# Patient Record
Sex: FEMALE | Race: WHITE | Employment: UNEMPLOYED | URBAN - METROPOLITAN AREA
[De-identification: names, ages, dates, MRNs, and addresses within clinical notes are randomized per-mention and may not be internally consistent; named-entity substitution may affect disease eponyms.]

---

## 2024-05-10 ENCOUNTER — OFFICE VISIT (OUTPATIENT)
Dept: FAMILY MEDICINE CLINIC | Facility: CLINIC | Age: 24
End: 2024-05-10
Payer: COMMERCIAL

## 2024-05-10 VITALS
TEMPERATURE: 97.4 F | BODY MASS INDEX: 20.22 KG/M2 | SYSTOLIC BLOOD PRESSURE: 128 MMHG | DIASTOLIC BLOOD PRESSURE: 70 MMHG | WEIGHT: 118.4 LBS | HEART RATE: 74 BPM | HEIGHT: 64 IN

## 2024-05-10 DIAGNOSIS — Z11.3 SCREENING EXAMINATION FOR STI: ICD-10-CM

## 2024-05-10 DIAGNOSIS — R25.1 TREMOR: ICD-10-CM

## 2024-05-10 DIAGNOSIS — N92.6 IRREGULAR PERIODS: ICD-10-CM

## 2024-05-10 DIAGNOSIS — F32.A ANXIETY AND DEPRESSION: ICD-10-CM

## 2024-05-10 DIAGNOSIS — L98.9 SKIN LESION: ICD-10-CM

## 2024-05-10 DIAGNOSIS — Z23 ENCOUNTER FOR IMMUNIZATION: ICD-10-CM

## 2024-05-10 DIAGNOSIS — Z12.4 SCREENING FOR CERVICAL CANCER: ICD-10-CM

## 2024-05-10 DIAGNOSIS — Z76.89 ENCOUNTER TO ESTABLISH CARE: Primary | ICD-10-CM

## 2024-05-10 DIAGNOSIS — F41.9 ANXIETY AND DEPRESSION: ICD-10-CM

## 2024-05-10 DIAGNOSIS — Z13.6 SCREENING FOR CARDIOVASCULAR CONDITION: ICD-10-CM

## 2024-05-10 DIAGNOSIS — E28.2 PCOS (POLYCYSTIC OVARIAN SYNDROME): ICD-10-CM

## 2024-05-10 PROCEDURE — 99203 OFFICE O/P NEW LOW 30 MIN: CPT | Performed by: NURSE PRACTITIONER

## 2024-05-10 NOTE — PATIENT INSTRUCTIONS
Identify 5 Things You Can See  The first step is to look around you and identify five things you can see right now. Feel free to say them out loud or whisper them in your mind as you describe each item in detail to yourself, says Isaias.    They could be anything, like a plant in the corner, a painting on the wall, a crack in the tile, a bird outside the window, or a  plugged into a socket.    Identify 4 Things You Can Feel  The next step is to pay attention to how you feel. For this step, you can close your eyes, if that feels comfortable, or gently fix your gaze downward, says Isaias.    Now, list four things that you can feel. It could be a slight breeze, the warmth of the sun, the fabric of your clothes against your skin, the texture of your couch against your arm, or your breath as it goes in and out.    Identify 3 Things You Can Hear  Then, pay attention to the sounds around you and identify three things you can hear. It could be a horn blaring in the background, the low hum of the heater, or the television playing in another room.    For this step, you can continue to keep your eyes closed or your gaze downward, Isaias says. Notice the variety of sounds and their qualities.    Identify 2 Things You Can Smell  Next, engage your sense of smell. With your eyes still focused or closed, take a moment to notice two things you can smell right now, says Isaias. It could be the fumes of traffic, the air freshener in the room, or the aroma of food wafting toward you from a nearby eatery.    If you're having trouble identifying any smells, you can take a sniff of your coffee, a nearby flower, or even just the air itself.    Identify 1 Thing You Can Taste  Finally, bring your attention to your taste buds and identify one thing you can taste. It can be the balm on your lips, the gum you’re chewing, or the lingering flavor of the lemonade you had earlier.

## 2024-05-10 NOTE — PROGRESS NOTES
Name: Nora Alexander      : 2000      MRN: [id number]  Encounter Provider: GABE Lazaro  Encounter Date: 5/10/2024   Encounter department: [facility]    Assessment & Plan     1. Encounter to establish care    2. Anxiety and depression  -     TSH, 3rd generation with Free T4 reflex; Future  -     Insulin, fasting; Future    3. PCOS (polycystic ovarian syndrome)  -     Insulin, fasting; Future  -     Hemoglobin A1C; Future    4. Irregular periods  -     Ambulatory referral to Obstetrics / Gynecology; Future  -     Insulin, fasting; Future    5. Screening for cervical cancer  -     Ambulatory referral to Obstetrics / Gynecology; Future  -     Insulin, fasting; Future    6. Encounter for immunization  -     TDAP VACCINE GREATER THAN OR EQUAL TO 6YO IM  -     Insulin, fasting; Future    7. Skin lesion  -     Ambulatory Referral to Dermatology; Future  -     Insulin, fasting; Future    8. Screening for cardiovascular condition  -     CBC and differential; Future  -     Comprehensive metabolic panel; Future  -     Lipid panel; Future  -     Insulin, fasting; Future    9. Tremor  -     Magnesium; Future    10. Screening examination for STI  -     HSV 1/2 IgM and Type Specific IgG; Future  -     HIV 1/2 AG/AB w Reflex [facility] for 2 yr old and above; Future  -     RPR-Syphilis Screening (Total Syphilis IGG/IGM); Future  -     Chlamydia/GC amplified DNA by PCR; Future  -     Hepatitis C antibody; Future           Subjective      Patient presents to establish care. Patient did live in the [facility] for 4 years and had to move back due to the war. 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 Has a hx of PCOS. Did smoke for 3 year and quit now vapes, counseled. Occasional alcohol and marijuana use.     PCOS- was dx a few years ago. Was on OCP one time and did not like how she felt with it, does not wish to start any birth controlled again. Menses at 13 or 14 years old. States her  cycle is irregular. Usually about 1.5 months between cycles. Also has increase hair growth around areolas, thighs and abdomen which is what started work up for PCOS. She does notice that she has mood changes 1-2 weeks around her cycle. Does often times have anxiety and depression but it is worse around the time of her period. Has anxiety about the states of the world. Her boyfriend on the front lines in the [facility] and she still has family there, does want to return one day. 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 She will try to focus on some thing but her anxious thoughts will happen. Sometimes it effects her sleep. She does feel down but no SI/HI. States there are times she feels like she needs medications and then other times she does not. She did do therapy in the past but was no consistent with it. Does not feel like she needs further intervention for this but will call if symptoms worsen.     Skin lesion- right side of the neck, has had for years but it is getting bigger and darker/black in a spot. Also wants full skin check. F/u with derm. Skin protection reviewed.     Tremor- sometimes randomly her hands tremor- not always when she is anxious. Ongoing for a while. Fhx of the same. Will keep diary of when this happens and have labs checked. No other associated symptoms. Does go away after a few minutes.     States she did have a herpes outbreak in the past but does not remember if they did confirmatory testing, requesting STI screening- no symptoms           PHQ-2/9 Depression Screening    Little interest or pleasure in doing things: 1 - several days  Feeling down, depressed, or hopeless: 1 - several days  Trouble falling or staying asleep, or sleeping too much: 2 - more than half the days  Feeling tired or having little energy: 3 - nearly every day  Poor appetite or overeatin - more than half the days  Feeling bad  about yourself - or that you are a failure or have let yourself or your family down: 0 - not at all  Trouble concentrating on things, such as reading the newspaper or watching television: 1 - several days  Moving or speaking so slowly that other people could have noticed. Or the opposite - being so fidgety or restless that you have been moving around a lot more than usual: 1 - several days  Thoughts that you would be better off dead, or of hurting yourself in some way: 0 - not at all  PHQ-2 Score: 2  PHQ-2 Interpretation: Negative depression screen  PHQ-9 Score: 11  PHQ-9 Interpretation: Moderate depression       USHA-7 Flowsheet Screening    Flowsheet Row Most Recent Value   Over the last 2 weeks, how often have you been bothered by any of the following problems?    Feeling nervous, anxious, or on edge 1   Not being able to stop or control worrying 2   Worrying too much about different things 2   Trouble relaxing 1   Being so restless that it is hard to sit still 1   Becoming easily annoyed or irritable 3   Feeling afraid as if something awful might happen 0   USHA-7 Total Score 10            Review of Systems   Constitutional:  Negative for chills and fever.   HENT:  Negative for ear pain and sore throat.    Eyes:  Negative for pain and visual disturbance.   Respiratory:  Negative for cough and shortness of breath.    Cardiovascular:  Negative for chest pain and palpitations.   Gastrointestinal:  Negative for abdominal pain, constipation, diarrhea, nausea and vomiting.   Genitourinary:  Positive for menstrual problem. Negative for difficulty urinating, dysuria, hematuria and urgency.   Musculoskeletal:  Negative for arthralgias and back pain.   Skin:  Negative for color change and rash.   Neurological:  Negative for dizziness, seizures, syncope and headaches.        Tremor    Psychiatric/Behavioral:  Positive for dysphoric mood and sleep disturbance. Negative for self-injury and suicidal ideas. The patient is  "nervous/anxious.    All other systems reviewed and are negative.      No current outpatient medications on file prior to visit.       Objective     /70   Pulse 74   Temp (!) 97.4 °F (36.3 °C) (Tympanic)   Ht 5' 4\" (1.626 m)   Wt 53.7 kg (118 lb 6.4 oz)   LMP 04/10/2024 (Exact Date)   BMI 20.32 kg/m²     Physical Exam  Vitals and nursing note reviewed.   Constitutional:       General: She is not in acute distress.     Appearance: Normal appearance. She is not ill-appearing or toxic-appearing.   HENT:      Head:     Cardiovascular:      Rate and Rhythm: Normal rate and regular rhythm.      Pulses: Normal pulses.      Heart sounds: Normal heart sounds. No murmur heard.     No friction rub. No gallop.   Pulmonary:      Effort: Pulmonary effort is normal. No respiratory distress.      Breath sounds: Normal breath sounds. No stridor. No wheezing or rales.   Musculoskeletal:      Cervical back: Normal range of motion. No rigidity.      Right lower leg: No edema.      Left lower leg: No edema.   Lymphadenopathy:      Cervical: No cervical adenopathy.   Skin:     General: Skin is warm and dry.      Coloration: Skin is not jaundiced.      Findings: No rash.   Neurological:      General: No focal deficit present.      Mental Status: She is alert and oriented to person, place, and time. Mental status is at baseline.      Motor: No weakness.      Gait: Gait normal.   Psychiatric:         Mood and Affect: Mood normal.         Behavior: Behavior normal.         Thought Content: Thought content normal.         Judgment: Judgment normal.       GABE Lazaro    "

## 2024-05-17 ENCOUNTER — TELEPHONE (OUTPATIENT)
Age: 24
End: 2024-05-17

## 2024-05-17 NOTE — TELEPHONE ENCOUNTER
Calling for status check on dermatology referral; still pending, per chart. Reassured patient that office will touch base with her as soon as the referral is processed, but may check in with office.     She mentioned MyChart issues, gave tech support phone number: [phone], option 5.    Please advise, return patient's call if needed.

## 2024-05-24 ENCOUNTER — TELEPHONE (OUTPATIENT)
Age: 24
End: 2024-05-24

## 2024-05-24 NOTE — TELEPHONE ENCOUNTER
Patient called to get an update on the referral to Dermatology.  I provided her with the phone number in the referral to schedule an appointment.  She will call us with any further questions/concerns.

## 2024-05-24 NOTE — TELEPHONE ENCOUNTER
Patient called to schedule NP derm appt with routine referral. Offered next available, patient declined.

## 2024-05-24 NOTE — TELEPHONE ENCOUNTER
Patient has made numerous calls to [facility]'s dermatologist.  She has not got a date sooner that April 2025.  She is very concerned because her skin lesion needs to be possibly removed due to it changing shape.  She is wondering if there is someone else you could recommend or if you could try and get her in sooner.  Please give patient a call and let her know.  Thank you.

## 2024-06-03 ENCOUNTER — TELEPHONE (OUTPATIENT)
Age: 24
End: 2024-06-03

## 2024-06-03 NOTE — TELEPHONE ENCOUNTER
Left patient a message if she calls back PLEASE LET PATIENT SHE WOULD SCHEDULE HER DERMATOLOGY APT. AS PER REASONS FROM  MESSAGE BELOW

## 2024-06-03 NOTE — TELEPHONE ENCOUNTER
Patient called states she can't get an appointment with Dermatology anytime soon next available is April 2025.    Patient had provided 2 Dermatologist her insurance will cover    [facility] [phone]  [facility] [place] [phone] or [phone]    Given her 2 other Dermatologist names as well to call for appt    [facility]  Dr. Albert     Patient is asking if she can get some assistance to scheduling her an appt. She has a lot of concerns about the few skin lesions she has. She is very worried about it doesn't want to wait until April to have this checked out.      Please Advise.  Thank you.

## 2024-06-03 NOTE — TELEPHONE ENCOUNTER
Left message for patient she would call to schedule her apt. Due to apt. Locations she is willing to go to and times she is available  as well as medical questions they may ask.

## 2024-06-03 NOTE — TELEPHONE ENCOUNTER
Patient called back to speak with Lydia Sauceda but I didn't know how to reach this person. Please call patient at [phone] you may need to call her twice due to issues with calls going through to her.

## 2024-06-04 LAB
ALBUMIN SERPL-MCNC: 4.3 G/DL (ref 3.5–5.7)
ALP SERPL-CCNC: 46 U/L (ref 35–120)
ALT SERPL-CCNC: 7 U/L
ANION GAP SERPL CALCULATED.3IONS-SCNC: 8 MMOL/L (ref 3–11)
AST SERPL-CCNC: 12 U/L
BASOPHILS # BLD AUTO: 0 THOU/CMM (ref 0–0.1)
BASOPHILS NFR BLD AUTO: 1 %
BILIRUB SERPL-MCNC: 0.5 MG/DL (ref 0.2–1)
BUN SERPL-MCNC: 13 MG/DL (ref 7–25)
CALCIUM SERPL-MCNC: 9 MG/DL (ref 8.5–10.1)
CHLORIDE SERPL-SCNC: 100 MMOL/L (ref 100–109)
CHOLEST SERPL-MCNC: 149 MG/DL
CHOLEST/HDLC SERPL: 2.5 {RATIO}
CO2 SERPL-SCNC: 25 MMOL/L (ref 21–31)
COMMENT: ABNORMAL
CREAT SERPL-MCNC: 0.69 MG/DL (ref 0.4–1.1)
CYTOLOGY CMNT CVX/VAG CYTO-IMP: ABNORMAL
DIFFERENTIAL METHOD BLD: ABNORMAL
EOSINOPHIL # BLD AUTO: 0.1 THOU/CMM (ref 0–0.5)
EOSINOPHIL NFR BLD AUTO: 2 %
ERYTHROCYTE [DISTWIDTH] IN BLOOD BY AUTOMATED COUNT: 12.3 % (ref 12–16)
EST. AVERAGE GLUCOSE BLD GHB EST-MCNC: 100 MG/DL
GFR/BSA.PRED SERPLBLD CYS-BASED-ARV: 123 ML/MIN/{1.73_M2}
GLUCOSE SERPL-MCNC: 78 MG/DL (ref 65–99)
HBA1C MFR BLD: 5.1 %
HCT VFR BLD AUTO: 37.5 % (ref 35–43)
HCV AB SERPL QL IA: NONREACTIVE
HDLC SERPL-MCNC: 60 MG/DL (ref 23–92)
HGB BLD-MCNC: 13 G/DL (ref 11.5–14.5)
HIV 1+2 AB+HIV1 P24 AG SERPL QL IA: NONREACTIVE
HSV1 IGG SER IA-ACNC: 7.12 INDEX
HSV2 IGG SER IA-ACNC: 0.18 INDEX
INSULIN SERPL-ACNC: 1.6 UIU/ML (ref 3.2–16.3)
LDLC SERPL CALC-MCNC: 80 MG/DL
LYMPHOCYTES # BLD AUTO: 1.3 THOU/CMM (ref 1–3)
LYMPHOCYTES NFR BLD AUTO: 28 %
MAGNESIUM SERPL-MCNC: 1.9 MG/DL (ref 1.4–2.2)
MCH RBC QN AUTO: 30.5 PG (ref 26–34)
MCHC RBC AUTO-ENTMCNC: 34.6 G/DL (ref 32–37)
MCV RBC AUTO: 88 FL (ref 80–100)
MONOCYTES # BLD AUTO: 0.3 THOU/CMM (ref 0.3–1)
MONOCYTES NFR BLD AUTO: 5 %
NEUTROPHILS # BLD AUTO: 3 THOU/CMM (ref 1.8–7.8)
NEUTROPHILS NFR BLD AUTO: 64 %
NONHDLC SERPL-MCNC: 89 MG/DL
PLATELET # BLD AUTO: 265 THOU/CMM (ref 140–350)
PMV BLD REES-ECKER: 7.4 FL (ref 7.5–11.3)
POTASSIUM SERPL-SCNC: 3.9 MMOL/L (ref 3.5–5.2)
PROT SERPL-MCNC: 6.1 G/DL (ref 6.3–8.3)
RBC # BLD AUTO: 4.25 MILL/CMM (ref 3.7–4.7)
SODIUM SERPL-SCNC: 133 MMOL/L (ref 135–145)
T PALLIDUM AB SER-ACNC: NONREACTIVE
TRIGL SERPL-MCNC: 43 MG/DL
TSH SERPL-ACNC: 0.99 UIU/ML (ref 0.45–5.33)
WBC # BLD AUTO: 4.7 THOU/CMM (ref 4–10)

## 2024-06-05 LAB
C TRACH RRNA SPEC QL NAA+PROBE: NOT DETECTED
N GONORRHOEA RRNA SPEC QL NAA+PROBE: NOT DETECTED
SPECIMEN SOURCE: NORMAL
SPECIMEN: NORMAL

## 2024-06-11 ENCOUNTER — OFFICE VISIT (OUTPATIENT)
Dept: FAMILY MEDICINE CLINIC | Facility: CLINIC | Age: 24
End: 2024-06-11
Payer: COMMERCIAL

## 2024-06-11 VITALS
BODY MASS INDEX: 19.97 KG/M2 | SYSTOLIC BLOOD PRESSURE: 110 MMHG | TEMPERATURE: 98 F | OXYGEN SATURATION: 97 % | HEART RATE: 67 BPM | DIASTOLIC BLOOD PRESSURE: 60 MMHG | WEIGHT: 117 LBS | HEIGHT: 64 IN

## 2024-06-11 DIAGNOSIS — B00.9 HSV-1 INFECTION: ICD-10-CM

## 2024-06-11 DIAGNOSIS — R25.1 TREMOR: ICD-10-CM

## 2024-06-11 DIAGNOSIS — F32.A ANXIETY AND DEPRESSION: ICD-10-CM

## 2024-06-11 DIAGNOSIS — F41.9 ANXIETY AND DEPRESSION: ICD-10-CM

## 2024-06-11 DIAGNOSIS — Z23 ENCOUNTER FOR IMMUNIZATION: ICD-10-CM

## 2024-06-11 DIAGNOSIS — R79.89 INAPPROPRIATELY LOW SERUM INSULIN: ICD-10-CM

## 2024-06-11 DIAGNOSIS — E87.1 HYPONATREMIA: ICD-10-CM

## 2024-06-11 DIAGNOSIS — Z00.00 ANNUAL PHYSICAL EXAM: Primary | ICD-10-CM

## 2024-06-11 PROCEDURE — 99395 PREV VISIT EST AGE 18-39: CPT | Performed by: NURSE PRACTITIONER

## 2024-06-11 PROCEDURE — 99214 OFFICE O/P EST MOD 30 MIN: CPT | Performed by: NURSE PRACTITIONER

## 2024-06-11 RX ORDER — CLINDAMYCIN PHOSPHATE AND BENZOYL PEROXIDE 10; 50 MG/G; MG/G
GEL TOPICAL
COMMUNITY
Start: 2024-06-04

## 2024-06-11 RX ORDER — DOXYCYCLINE HYCLATE 100 MG/1
CAPSULE ORAL
COMMUNITY
Start: 2024-06-04

## 2024-06-11 RX ORDER — SULFACETAMIDE SODIUM, SULFUR 100; 50 MG/G; MG/G
EMULSION TOPICAL
COMMUNITY
Start: 2024-06-04

## 2024-06-11 NOTE — PATIENT INSTRUCTIONS
Start the Zoloft daily 1/2 tablet for 5 days and then increase to 1 tablet daily.         Wellness Visit for Adults   AMBULATORY CARE:   A wellness visit  is when you see your healthcare provider to get screened for health problems. Your healthcare provider will also give you advice on how to stay healthy. Write down your questions so you remember to ask them. Ask your healthcare provider how often you should have a wellness visit.  What happens at a wellness visit:  Your healthcare provider will ask about your health, and your family history of health problems. This includes high blood pressure, heart disease, and cancer. He or she will ask if you have symptoms that concern you, if you smoke, and about your mood. You may also be asked about your intake of medicines, supplements, food, and alcohol. Any of the following may be done:  Your weight  will be checked. Your height may also be checked so your body mass index (BMI) can be calculated. Your BMI shows if you are at a healthy weight.    Your blood pressure  and heart rate will be checked. Your temperature may also be checked.    Blood and urine tests  may be done. Blood tests may be done to check your cholesterol levels. Abnormal cholesterol levels increase your risk for heart disease and stroke. You may also need a blood or urine test to check for diabetes if you are at increased risk. Urine tests may be done to look for signs of an infection or kidney disease.    A physical exam  includes checking your heartbeat and lungs with a stethoscope. Your healthcare provider may also check your skin to look for sun damage.    Screening tests  may be recommended. A screening test is done to check for diseases that may not cause symptoms. The screening tests you may need depend on your age, gender, family history, and lifestyle habits. For example, colorectal screening may be recommended if you are 50 years old or older.    Screening tests you need if you are a woman:    A Pap smear  is used to screen for cervical cancer. Pap smears are usually done every 3 to 5 years depending on your age. You may need them more often if you have had abnormal Pap smear test results in the past. Ask your healthcare provider how often you should have a Pap smear.    A mammogram  is an x-ray of your breasts to screen for breast cancer. Experts recommend mammograms every 2 years starting at age 50 years. You may need a mammogram at age 49 years or younger if you have an increased risk for breast cancer. Talk to your healthcare provider about when you should start having mammograms and how often you need them.    Vaccines you may need:   Get an influenza vaccine  every year. The influenza vaccine protects you from the flu. Several types of viruses cause the flu. The viruses change over time, so new vaccines are made each year.    Get a tetanus-diphtheria (Td) booster vaccine  every 10 years. This vaccine protects you against tetanus and diphtheria. Tetanus is a severe infection that may cause painful muscle spasms and lockjaw. Diphtheria is a severe bacterial infection that causes a thick covering in the back of your mouth and throat.    Get a human papillomavirus (HPV) vaccine  if you are female and aged 19 to 26 or male 19 to 21 and never received it. This vaccine protects you from HPV infection. HPV is the most common infection spread by sexual contact. HPV may also cause vaginal, penile, and anal cancers.    Get a pneumococcal vaccine  if you are aged 65 years or older. The pneumococcal vaccine is an injection given to protect you from pneumococcal disease. Pneumococcal disease is an infection caused by pneumococcal bacteria. The infection may cause pneumonia, meningitis, or an ear infection.    Get a shingles vaccine  if you are 60 or older, even if you have had shingles before. The shingles vaccine is an injection to protect you from the varicella-zoster virus. This is the same virus that causes  chickenpox. Shingles is a painful rash that develops in people who had chickenpox or have been exposed to the virus.    How to eat healthy:  My Plate is a model for planning healthy meals. It shows the types and amounts of foods that should go on your plate. Fruits and vegetables make up about half of your plate, and grains and protein make up the other half. A serving of dairy is included on the side of your plate. The amount of calories and serving sizes you need depends on your age, gender, weight, and height. Examples of healthy foods are listed below:  Eat a variety of vegetables  such as dark green, red, and orange vegetables. You can also include canned vegetables low in sodium (salt) and frozen vegetables without added butter or sauces.    Eat a variety of fresh fruits , canned fruit in 100% juice, frozen fruit, and dried fruit.    Include whole grains.  At least half of the grains you eat should be whole grains. Examples include whole-wheat bread, wheat pasta, brown rice, and whole-grain cereals such as oatmeal.    Eat a variety of protein foods such as seafood (fish and shellfish), lean meat, and poultry without skin (turkey and chicken). Examples of lean meats include pork leg, shoulder, or tenderloin, and beef round, sirloin, tenderloin, and extra lean ground beef. Other protein foods include eggs and egg substitutes, beans, peas, soy products, nuts, and seeds.    Choose low-fat dairy products such as skim or 1% milk or low-fat yogurt, cheese, and cottage cheese.    Limit unhealthy fats  such as butter, hard margarine, and shortening.       Exercise:  Exercise at least 30 minutes per day on most days of the week. Some examples of exercise include walking, biking, dancing, and swimming. You can also fit in more physical activity by taking the stairs instead of the elevator or parking farther away from stores. Include muscle strengthening activities 2 days each week. Regular exercise provides many health  benefits. It helps you manage your weight, and decreases your risk for type 2 diabetes, heart disease, stroke, and high blood pressure. Exercise can also help improve your mood. Ask your healthcare provider about the best exercise plan for you.       General health and safety guidelines:   Do not smoke.  Nicotine and other chemicals in cigarettes and cigars can cause lung damage. Ask your healthcare provider for information if you currently smoke and need help to quit. E-cigarettes or smokeless tobacco still contain nicotine. Talk to your healthcare provider before you use these products.    Limit alcohol.  A drink of alcohol is 12 ounces of beer, 5 ounces of wine, or 1½ ounces of liquor.    Lose weight, if needed.  Being overweight increases your risk of certain health conditions. These include heart disease, high blood pressure, type 2 diabetes, and certain types of cancer.    Protect your skin.  Do not sunbathe or use tanning beds. Use sunscreen with a SPF 15 or higher. Apply sunscreen at least 15 minutes before you go outside. Reapply sunscreen every 2 hours. Wear protective clothing, hats, and sunglasses when you are outside.    Drive safely.  Always wear your seatbelt. Make sure everyone in your car wears a seatbelt. A seatbelt can save your life if you are in an accident. Do not use your cell phone when you are driving. This could distract you and cause an accident. Pull over if you need to make a call or send a text message.    Practice safe sex.  Use latex condoms if are sexually active and have more than one partner. Your healthcare provider may recommend screening tests for sexually transmitted infections (STIs).    Wear helmets, lifejackets, and protective gear.  Always wear a helmet when you ride a bike or motorcycle, go skiing, or play sports that could cause a head injury. Wear protective equipment when you play sports. Wear a lifejacket when you are on a boat or doing water sports.    © Copyright  [employer] 2023 Information is for End User's use only and may not be sold, redistributed or otherwise used for commercial purposes.  The above information is an  only. It is not intended as medical advice for individual conditions or treatments. Talk to your doctor, nurse or pharmacist before following any medical regimen to see if it is safe and effective for you.

## 2024-06-11 NOTE — PROGRESS NOTES
Adult Annual Physical  Name: Nora Alexander      : 2000      MRN: [id number]  Encounter Provider: GABE Lazaro  Encounter Date: 2024   Encounter department: [facility]    Assessment & Plan   1. Annual physical exam  2. Anxiety and depression  -     sertraline (Zoloft) 50 mg tablet; Take 1 tablet (50 mg total) by mouth daily  3. Encounter for immunization  Comments:  will find out with her insurance if HPV is covered  4. Inappropriately low serum insulin  -     Insulin, fasting; Future  -     Insulin, random; Future  5. Hyponatremia  -     Comprehensive metabolic panel; Future  6. Tremor  7. HSV-1 infection  Comments:  + antibodies    Immunizations and preventive care screenings were discussed with patient today. Appropriate education was printed on patient's after visit summary.    Counseling:  Dental Health: discussed importance of regular tooth brushing, flossing, and dental visits.  Exercise: the importance of regular exercise/physical activity was discussed. Recommend exercise 3-5 times per week for at least 30 minutes.       Depression Screening and Follow-up Plan: Patient's depression screening was positive with a PHQ-2 score of 4. Their PHQ-9 score was 14. Patient assessed for underlying major depression. Brief counseling provided and recommend additional follow-up/re-evaluation next office visit.         History of Present Illness   {Disappearing Hyperlinks I Encounters * My Last Note * Since Last Visit * History :50025}  Adult Annual Physical:  Patient presents for annual physical. Patient has been noticing an increase in anxiety and depression. States that since her last visit, four people close to her have talked to her about her symptoms. She feels it is mostly very anxious and worried about the future and something bad happening. She is busy so this does help but when she has time to think about things, she does realize she has depression symptoms  as well. Tremor only happened one  time since last visit and it was when she was very nervous.  She does not eat well due tot his. Only eats small amounts does not feel hungry often. She also only sleeps 4-5 hours some nights but thinks this is related to her schedule.     Does sometimes get tonsil stones- good oral hygiene and hydration reviewed.     Insulin was low but will repeat this other labs wnl.   Sodium slightly low- no symptoms, repeat in 2 months.     HSV 1 positive no symptoms, counseled on s/s to watch for and prevention of transmission .     Diet and Physical Activity:  - Diet/Nutrition:. has noticed not eating a lot due to anxiety and depression, some days only eating one meal per day., was eating more carbs and veggies now trying to incorporate more proteins.    Depression Screening:  - PHQ-2 Score: 4  - PHQ-9 Score: 14    General Health:  - Sleep:. sometimes under sleeping 4-6 hours and sometimes napping 2 hours during the day.  - Hearing: normal hearing bilateral ears.  - Vision: wears glasses, wears contacts and vision problems.  - Dental: regular dental visits and brushes teeth twice daily.    /GYN Health:  - Follows with GYN: yes.   - Menopause: premenopausal.     Advanced Care Planning:  - Has an advanced directive?: no    - Has a durable medical POA?: no    - ACP document given to patient?: no      Review of Systems   Constitutional:  Negative for chills and fever.   HENT:  Negative for ear pain and sore throat.    Eyes:  Negative for pain and visual disturbance.   Respiratory:  Negative for cough and shortness of breath.    Cardiovascular:  Negative for chest pain and palpitations.   Gastrointestinal:  Negative for abdominal pain, constipation, diarrhea, nausea and vomiting.   Genitourinary:  Negative for dysuria and hematuria.   Musculoskeletal:  Negative for arthralgias and back pain.   Skin:  Negative for color change and rash.   Neurological:  Negative for seizures and syncope.  "  Psychiatric/Behavioral:  Positive for dysphoric mood and sleep disturbance. Negative for self-injury and suicidal ideas. The patient is nervous/anxious.    All other systems reviewed and are negative.        Objective   {Disappearing Hyperlinks   Review Vitals * Enter New Vitals * Results Review * Labs * Imaging * Cardiology * Procedures * Lung Cancer Screening :48236}  /60   Pulse 67   Temp 98 °F (36.7 °C)   Ht 5' 4\" (1.626 m)   Wt 53.1 kg (117 lb)   LMP 05/22/2024 (Exact Date)   SpO2 97%   BMI 20.08 kg/m²     Physical Exam  Vitals and nursing note reviewed.   Constitutional:       General: She is not in acute distress.     Appearance: Normal appearance. She is well-developed and normal weight.   HENT:      Head: Normocephalic and atraumatic.      Right Ear: Tympanic membrane, ear canal and external ear normal. There is no impacted cerumen.      Left Ear: Tympanic membrane, ear canal and external ear normal. There is no impacted cerumen.      Nose: Nose normal. No congestion or rhinorrhea.      Mouth/Throat:      Mouth: Mucous membranes are moist.      Pharynx: Oropharynx is clear. No oropharyngeal exudate or posterior oropharyngeal erythema.   Eyes:      General: No scleral icterus.        Right eye: No discharge.         Left eye: No discharge.      Extraocular Movements: Extraocular movements intact.      Conjunctiva/sclera: Conjunctivae normal.      Pupils: Pupils are equal, round, and reactive to light.   Cardiovascular:      Rate and Rhythm: Normal rate and regular rhythm.      Pulses: Normal pulses.      Heart sounds: Normal heart sounds. No murmur heard.  Pulmonary:      Effort: Pulmonary effort is normal. No respiratory distress.      Breath sounds: Normal breath sounds. No wheezing or rales.   Abdominal:      General: Abdomen is flat. Bowel sounds are normal. There is no distension.      Palpations: Abdomen is soft.      Tenderness: There is no abdominal tenderness. There is no guarding. "   Musculoskeletal:         General: No swelling. Normal range of motion.      Cervical back: Normal range of motion and neck supple. No rigidity or tenderness.      Right lower leg: No edema.      Left lower leg: No edema.   Lymphadenopathy:      Cervical: No cervical adenopathy.   Skin:     General: Skin is warm and dry.      Capillary Refill: Capillary refill takes less than 2 seconds.      Findings: No bruising, erythema or lesion.   Neurological:      General: No focal deficit present.      Mental Status: She is alert and oriented to person, place, and time. Mental status is at baseline.      Motor: No weakness.      Coordination: Coordination normal.      Gait: Gait normal.   Psychiatric:         Mood and Affect: Mood normal.         Behavior: Behavior normal.         Thought Content: Thought content normal.         Judgment: Judgment normal.       Administrative Statements {Disappearing Hyperlinks I  Level of Service * [facility]}

## 2024-06-24 ENCOUNTER — TELEPHONE (OUTPATIENT)
Age: 24
End: 2024-06-24

## 2024-06-24 NOTE — TELEPHONE ENCOUNTER
Left message for patient   
Nora Alexander   to  Primary [facility] (supporting GABE Lazaro)   MP      6/24/24 10:47 AM  Good morning!     The  of the Zoloft I received is Da ADDISON  
Pt called stating she feels there was a miscommunication in the [employer] messages that were sent previously regarding her Zoloft.  Pt states she felt the medical advice given was based off the UC assessment rather than what pt stated occurred.    Pt states the chemical taste was the initial side effect but she eventually developed swelling of her cheeks, lips and tongue to the point that her speech was affected.  It was noticeable to those who were speaking with her.  Pt then took Benadryl to help with s/s and then d/c'd use of medications on 6/13/24.  Pt would like it noted on her chart that she is allergic to this medication.      Advised pt to call [facility] to determine  of medication.  Also advised pt to schedule sooner appt with PCP for f/u than August.  Pt states she will check with her mom to see when she will be in town next.  Please review and advise if any further recommendations for pt.    
Yes

## 2024-06-27 ENCOUNTER — TELEPHONE (OUTPATIENT)
Age: 24
End: 2024-06-27

## 2024-06-27 ENCOUNTER — OFFICE VISIT (OUTPATIENT)
Dept: FAMILY MEDICINE CLINIC | Facility: CLINIC | Age: 24
End: 2024-06-27
Payer: COMMERCIAL

## 2024-06-27 VITALS
WEIGHT: 119.8 LBS | TEMPERATURE: 98.4 F | HEIGHT: 64 IN | SYSTOLIC BLOOD PRESSURE: 108 MMHG | DIASTOLIC BLOOD PRESSURE: 70 MMHG | BODY MASS INDEX: 20.45 KG/M2 | OXYGEN SATURATION: 98 % | HEART RATE: 94 BPM

## 2024-06-27 DIAGNOSIS — F41.9 ANXIETY AND DEPRESSION: Primary | ICD-10-CM

## 2024-06-27 DIAGNOSIS — F32.A ANXIETY AND DEPRESSION: Primary | ICD-10-CM

## 2024-06-27 DIAGNOSIS — Z23 ENCOUNTER FOR IMMUNIZATION: ICD-10-CM

## 2024-06-27 PROCEDURE — 99214 OFFICE O/P EST MOD 30 MIN: CPT | Performed by: NURSE PRACTITIONER

## 2024-06-27 RX ORDER — VENLAFAXINE HYDROCHLORIDE 37.5 MG/1
37.5 CAPSULE, EXTENDED RELEASE ORAL
Qty: 30 CAPSULE | Refills: 5 | Status: SHIPPED | OUTPATIENT
Start: 2024-06-27

## 2024-06-27 NOTE — PATIENT INSTRUCTIONS
Around 4 weeks (end of July) message me and let me know how you are doing with your Effexor. We can increase the dose at that time if needed.   Call sooner with other concerns as needed.

## 2024-06-27 NOTE — PROGRESS NOTES
Ambulatory Visit  Name: Nora Alexander      : 2000      MRN: [id number]  Encounter Provider: GABE Lazaro  Encounter Date: 2024   Encounter department: [facility]    Assessment & Plan   1. Anxiety and depression  -     venlafaxine (EFFEXOR-XR) 37.5 mg 24 hr capsule; Take 1 capsule (37.5 mg total) by mouth daily with breakfast  2. Encounter for immunization       History of Present Illness   {Disappearing Hyperlinks I Encounters * My Last Note * Since Last Visit * History :74157}  Patient presents for follow-up after starting Zoloft.  Patient states that she took her second dose of Zoloft and had tongue swelling, increase saliva itching of the throat.  She went to urgent care was given Benadryl and steroids and it resolved.  Has not taken Zoloft since.  Does want to take something for her anxiety and depression.  Will avoid SSRIs at this time and try Effexor to see how she responds to this.  She has a scheduled follow-up appointment in August but cannot come in sooner due to vacation.  Will message in 4 weeks with update on how she is doing and adjust dose as needed at this time.  Possible side effects reviewed.  Signs and symptoms of when to return reviewed.        Review of Systems   Constitutional:  Negative for chills and fever.   HENT:  Negative for congestion, ear pain, rhinorrhea, sinus pressure, sneezing, sore throat, trouble swallowing and voice change.    Respiratory:  Negative for cough and shortness of breath.    Cardiovascular:  Negative for chest pain and palpitations.   Gastrointestinal:  Negative for abdominal pain, nausea and vomiting.   Skin:  Negative for color change and rash.   Neurological:  Negative for dizziness, seizures, syncope and headaches.   Psychiatric/Behavioral:  Positive for dysphoric mood. Negative for self-injury and suicidal ideas. The patient is nervous/anxious.    All other systems reviewed and are negative.      Objective  "  {Disappearing Hyperlinks   Review Vitals * Enter New Vitals * Results Review * Labs * Imaging * Cardiology * Procedures * Lung Cancer Screening :20196}  /70   Pulse 94   Temp 98.4 °F (36.9 °C) (Tympanic)   Ht 5' 4\" (1.626 m)   Wt 54.3 kg (119 lb 12.8 oz)   LMP 05/22/2024 (Exact Date)   SpO2 98%   BMI 20.56 kg/m²     Physical Exam  Vitals and nursing note reviewed.   Constitutional:       General: She is not in acute distress.     Appearance: She is well-developed.   HENT:      Head: Normocephalic and atraumatic.   Cardiovascular:      Rate and Rhythm: Normal rate and regular rhythm.      Pulses: Normal pulses.      Heart sounds: Normal heart sounds. No murmur heard.  Pulmonary:      Effort: Pulmonary effort is normal. No respiratory distress.      Breath sounds: Normal breath sounds.   Skin:     General: Skin is warm and dry.      Capillary Refill: Capillary refill takes less than 2 seconds.   Neurological:      Mental Status: She is alert.   Psychiatric:         Mood and Affect: Mood is anxious.       Administrative Statements {Disappearing Hyperlinks I  Level of Service * [facility]:27000}    "

## 2024-06-27 NOTE — TELEPHONE ENCOUNTER
Pt was just seen by Joselyn, she forgot to ask would this situation be justifiable for an Epi Pen to be prescribed to her regarding her reaction to medication? Please advise and give pt a call back at your convenience.   GAYLE Pt asked if the first phone call does not go through to call again, her phone gives her problems and sometimes the first call doesn't go through.

## 2024-08-08 DIAGNOSIS — F41.9 ANXIETY AND DEPRESSION: ICD-10-CM

## 2024-08-08 DIAGNOSIS — F32.A ANXIETY AND DEPRESSION: ICD-10-CM

## 2024-08-08 RX ORDER — VENLAFAXINE HYDROCHLORIDE 75 MG/1
75 CAPSULE, EXTENDED RELEASE ORAL
Qty: 30 CAPSULE | Refills: 1 | Status: SHIPPED | OUTPATIENT
Start: 2024-08-08

## 2024-08-14 ENCOUNTER — RA CDI HCC (OUTPATIENT)
Dept: OTHER | Facility: HOSPITAL | Age: 24
End: 2024-08-14

## 2024-08-14 LAB
ALBUMIN SERPL-MCNC: 4.2 G/DL (ref 3.5–5.7)
ALP SERPL-CCNC: 50 U/L (ref 35–120)
ALT SERPL-CCNC: 10 U/L
ANION GAP SERPL CALCULATED.3IONS-SCNC: 8 MMOL/L (ref 3–11)
AST SERPL-CCNC: 12 U/L
BILIRUB SERPL-MCNC: 0.5 MG/DL (ref 0.2–1)
BUN SERPL-MCNC: 14 MG/DL (ref 7–25)
CALCIUM SERPL-MCNC: 8.9 MG/DL (ref 8.5–10.1)
CHLORIDE SERPL-SCNC: 104 MMOL/L (ref 100–109)
CO2 SERPL-SCNC: 26 MMOL/L (ref 21–31)
CREAT SERPL-MCNC: 0.74 MG/DL (ref 0.4–1.1)
CYTOLOGY CMNT CVX/VAG CYTO-IMP: ABNORMAL
GFR/BSA.PRED SERPLBLD CYS-BASED-ARV: 114 ML/MIN/{1.73_M2}
GLUCOSE SERPL-MCNC: 89 MG/DL (ref 65–99)
INSULIN SERPL-ACNC: 49 UIU/ML (ref 3.2–16.3)
INSULIN SERPL-ACNC: 5.2 UIU/ML (ref 3.2–16.3)
POTASSIUM SERPL-SCNC: 4.2 MMOL/L (ref 3.5–5.2)
PROT SERPL-MCNC: 6.2 G/DL (ref 6.3–8.3)
SODIUM SERPL-SCNC: 138 MMOL/L (ref 135–145)

## 2024-08-19 ENCOUNTER — TELEPHONE (OUTPATIENT)
Dept: FAMILY MEDICINE CLINIC | Facility: CLINIC | Age: 24
End: 2024-08-19

## 2024-08-19 NOTE — TELEPHONE ENCOUNTER
----- Message from GABE Fontenot sent at 8/19/2024  1:14 PM EDT -----  I have two different random insulin results that are completely different. One is 5.2 and one is 49 both from the same day, please call the lab and verify these results and why we have two under the one patient that are completely different.

## 2024-08-19 NOTE — TELEPHONE ENCOUNTER
I spoke with the lab and they stated had 2 different drawings that day for the same test. The increase in numbers was from if the patient ate between the two test. I tried calling the patient to verify this but was unable to reach her. Lm for her to call the office.

## 2024-08-21 ENCOUNTER — OFFICE VISIT (OUTPATIENT)
Dept: FAMILY MEDICINE CLINIC | Facility: CLINIC | Age: 24
End: 2024-08-21
Payer: COMMERCIAL

## 2024-08-21 VITALS
BODY MASS INDEX: 20.49 KG/M2 | SYSTOLIC BLOOD PRESSURE: 112 MMHG | TEMPERATURE: 98.6 F | HEART RATE: 78 BPM | WEIGHT: 120 LBS | HEIGHT: 64 IN | DIASTOLIC BLOOD PRESSURE: 70 MMHG | OXYGEN SATURATION: 99 %

## 2024-08-21 DIAGNOSIS — T14.8XXA INFECTED WOUND: Primary | ICD-10-CM

## 2024-08-21 DIAGNOSIS — Z23 ENCOUNTER FOR IMMUNIZATION: ICD-10-CM

## 2024-08-21 DIAGNOSIS — L08.9 INFECTED WOUND: Primary | ICD-10-CM

## 2024-08-21 DIAGNOSIS — Z13.1 SCREENING FOR DIABETES MELLITUS: ICD-10-CM

## 2024-08-21 DIAGNOSIS — F41.9 ANXIETY: ICD-10-CM

## 2024-08-21 DIAGNOSIS — Z12.4 SCREENING FOR CERVICAL CANCER: ICD-10-CM

## 2024-08-21 PROCEDURE — 99214 OFFICE O/P EST MOD 30 MIN: CPT | Performed by: NURSE PRACTITIONER

## 2024-08-21 RX ORDER — TRETINOIN 0.5 MG/G
CREAM TOPICAL
COMMUNITY
Start: 2024-06-25

## 2024-08-21 RX ORDER — MUPIROCIN 20 MG/G
OINTMENT TOPICAL 3 TIMES DAILY
Qty: 30 G | Refills: 0 | Status: SHIPPED | OUTPATIENT
Start: 2024-08-21

## 2024-08-21 NOTE — PROGRESS NOTES
Ambulatory Visit  Name: Nora Alexander      : 2000      MRN: [id number]  Encounter Provider: GABE Lazaro  Encounter Date: 2024   Encounter department: [facility]    Assessment & Plan   1. Infected wound  -     mupirocin (BACTROBAN) 2 % ointment; Apply topically 3 (three) times a day  2. Encounter for immunization  -     HPV VACCINE 9 VALENT IM  3. Screening for cervical cancer  4. Screening for diabetes mellitus  -     Insulin, fasting; Future; Expected date: 2024  -     Hemoglobin A1C; Future; Expected date: 2024  -     Glucose, fasting; Future; Expected date: 2024  5. Anxiety       History of Present Illness     Patient presents for follow up on anxiety and depression with the Effexor 75 mg daily. She states that she is feeling well with this and has noticed a significant reduction in her symptoms. She does think the environmental change over the summer helped as well as she is living in [place]. She does move back in the end of October and will see how she is doing once she moves back.  As for like it has helped to reduce her constant worrying throughout the day and feeling anxious about things and she is not feeling as down.  She is sleeping better as well.  Will continue to monitor.    She did have repeat insulin levels drawn her fasting insulin was normal and then she had a random insulin done after eating a large amount of [employer] and drinking of frappe and it was elevated but this is to be expected immediately after eating this type of meal.     She did have precancerous [facility] and had the margins removed but then the area became infected.  It is open at this time with some surrounding erythema.  She was seen by them yesterday and had the sutures removed.  The wound edges are not well-approximated.  She was given a course of doxycycline to take and I will also add on mupirocin  "ointment to use up to 3 times a day.  Wound care reviewed signs and symptoms of when to return reviewed.        Review of Systems   Constitutional:  Negative for chills and fever.   HENT:  Negative for ear pain and sore throat.    Eyes:  Negative for pain and visual disturbance.   Respiratory:  Negative for cough and shortness of breath.    Cardiovascular:  Negative for chest pain and palpitations.   Gastrointestinal:  Negative for abdominal pain, constipation, diarrhea and vomiting.   Genitourinary:  Negative for dysuria and hematuria.   Musculoskeletal:  Negative for arthralgias and back pain.   Skin:  Positive for wound. Negative for color change and rash.   Neurological:  Negative for seizures and syncope.   Psychiatric/Behavioral:  Negative for dysphoric mood, self-injury, sleep disturbance and suicidal ideas. The patient is not nervous/anxious.    All other systems reviewed and are negative.      Objective     /70   Pulse 78   Temp 98.6 °F (37 °C) (Tympanic)   Ht 5' 4\" (1.626 m)   Wt 54.4 kg (120 lb)   LMP 08/18/2024 (Exact Date)   SpO2 99%   BMI 20.60 kg/m²     Physical Exam  Vitals and nursing note reviewed.   Constitutional:       General: She is not in acute distress.     Appearance: She is well-developed.   HENT:      Head: Normocephalic and atraumatic.   Cardiovascular:      Rate and Rhythm: Normal rate and regular rhythm.      Pulses: Normal pulses.      Heart sounds: Normal heart sounds. No murmur heard.  Pulmonary:      Effort: Pulmonary effort is normal. No respiratory distress.      Breath sounds: Normal breath sounds.   Abdominal:      Palpations: Abdomen is soft.      Tenderness: There is no abdominal tenderness.   Musculoskeletal:         General: No swelling.      Cervical back: Neck supple.   Skin:     General: Skin is warm and dry.      Capillary Refill: Capillary refill takes less than 2 seconds.          Neurological:      Mental Status: She is alert.   Psychiatric:         Mood " and Affect: Mood normal.       Administrative Statements

## 2024-09-10 ENCOUNTER — TELEPHONE (OUTPATIENT)
Age: 24
End: 2024-09-10

## 2024-09-10 NOTE — TELEPHONE ENCOUNTER
Patient called to update insurance and to confirm refill request for Effexor went through [place].  Informed her I do see the refill request but is pending at this time.    Patient states she is still in [place], [place] and plans to go to an area Urgent Care there today or tomorrow, [facility], due to the infection of her back.  She states that it seems to have scabbed over but looks white and is healing slowly.  She would like to have it checked out to be sure.  I provided her with our fax number and she will get a printout from that visit as well so it will be available for Joselyn's review.  This is an FYI.

## 2024-10-03 ENCOUNTER — OFFICE VISIT (OUTPATIENT)
Dept: FAMILY MEDICINE CLINIC | Facility: CLINIC | Age: 24
End: 2024-10-03
Payer: COMMERCIAL

## 2024-10-03 VITALS
HEART RATE: 137 BPM | WEIGHT: 115.5 LBS | TEMPERATURE: 97.5 F | SYSTOLIC BLOOD PRESSURE: 136 MMHG | HEIGHT: 64 IN | BODY MASS INDEX: 19.72 KG/M2 | DIASTOLIC BLOOD PRESSURE: 82 MMHG | OXYGEN SATURATION: 99 %

## 2024-10-03 DIAGNOSIS — Z12.4 SCREENING FOR CERVICAL CANCER: ICD-10-CM

## 2024-10-03 DIAGNOSIS — F41.9 ANXIETY AND DEPRESSION: ICD-10-CM

## 2024-10-03 DIAGNOSIS — Z23 ENCOUNTER FOR IMMUNIZATION: ICD-10-CM

## 2024-10-03 DIAGNOSIS — Z51.89 VISIT FOR WOUND CHECK: Primary | ICD-10-CM

## 2024-10-03 DIAGNOSIS — F32.A ANXIETY AND DEPRESSION: ICD-10-CM

## 2024-10-03 PROCEDURE — 90472 IMMUNIZATION ADMIN EACH ADD: CPT

## 2024-10-03 PROCEDURE — 99214 OFFICE O/P EST MOD 30 MIN: CPT | Performed by: NURSE PRACTITIONER

## 2024-10-03 PROCEDURE — 90471 IMMUNIZATION ADMIN: CPT

## 2024-10-03 PROCEDURE — 90715 TDAP VACCINE 7 YRS/> IM: CPT

## 2024-10-03 PROCEDURE — 90651 9VHPV VACCINE 2/3 DOSE IM: CPT

## 2024-10-03 RX ORDER — VENLAFAXINE HYDROCHLORIDE 75 MG/1
75 CAPSULE, EXTENDED RELEASE ORAL
Qty: 90 CAPSULE | Refills: 1 | Status: SHIPPED | OUTPATIENT
Start: 2024-10-03

## 2024-10-03 NOTE — PROGRESS NOTES
Ambulatory Visit  Name: Nora Alexander      : 2000      MRN: [id number]  Encounter Provider: GABE Lazaro  Encounter Date: 10/3/2024   Encounter department: [facility]    Assessment & Plan  Visit for wound check  Presents for wound check on her back.  Had infection after having skin biopsy and sutures placed.  Was on doxycycline and then cephalexin along with triamcinolone.  Today the scab did fall off.  The wound has healed well it is closed no open areas.  Keloid formation is starting.  Can use Vaseline on this wound care reviewed.  Return with any worsening symptoms.       Anxiety and depression  Anxiety-depression; patient has noticed a significant improvement on Effexor 75 mg daily.  Feels like she is focusing better, not having intrusive thoughts throughout the day as much and she is having a decrease in her anxiety consistently.  Continue Effexor 75 mg daily.    Orders:    venlafaxine (EFFEXOR-XR) 75 mg 24 hr capsule; Take 1 capsule (75 mg total) by mouth daily with breakfast    Encounter for immunization    Orders:    HPV VACCINE 9 VALENT IM    TDAP VACCINE GREATER THAN OR EQUAL TO 8YO IM    influenza vaccine preservative-free 0.5 mL IM (Fluzone, Afluria, Fluarix, Flulaval)    Screening for cervical cancer            History of Present Illness         Anxiety-depression; patient has noticed a significant improvement on Effexor 75 mg daily.  Feels like she is focusing better, not having intrusive thoughts throughout the day as much and she is having a decrease in her anxiety consistently.  Continue Effexor 75 mg daily.          Review of Systems   Constitutional:  Negative for chills and fever.   Respiratory:  Negative for cough and shortness of breath.    Cardiovascular:  Negative for chest pain and palpitations.   Gastrointestinal:  Negative for abdominal pain, constipation, diarrhea, nausea and vomiting.   Musculoskeletal:  Negative for arthralgias and  "back pain.   Skin:  Negative for color change and rash.   Neurological:  Negative for dizziness, seizures, syncope and headaches.   Psychiatric/Behavioral:  Negative for decreased concentration, dysphoric mood, self-injury, sleep disturbance and suicidal ideas. The patient is nervous/anxious.    All other systems reviewed and are negative.          Objective     /82   Pulse (!) 137   Temp 97.5 °F (36.4 °C)   Ht 5' 4\" (1.626 m)   Wt 52.4 kg (115 lb 8 oz)   LMP 09/28/2024 (Exact Date)   SpO2 99%   BMI 19.83 kg/m²     Physical Exam  Vitals and nursing note reviewed.   Constitutional:       General: She is not in acute distress.     Appearance: She is well-developed.   HENT:      Head: Normocephalic and atraumatic.   Cardiovascular:      Rate and Rhythm: Normal rate and regular rhythm.      Pulses: Normal pulses.      Heart sounds: Normal heart sounds. No murmur heard.  Pulmonary:      Effort: Pulmonary effort is normal. No respiratory distress.      Breath sounds: Normal breath sounds.   Skin:     General: Skin is warm and dry.      Capillary Refill: Capillary refill takes less than 2 seconds.          Neurological:      Mental Status: She is alert.   Psychiatric:         Mood and Affect: Mood normal.         Behavior: Behavior normal.         Thought Content: Thought content normal.         Judgment: Judgment normal.         "

## 2024-10-22 ENCOUNTER — TELEPHONE (OUTPATIENT)
Dept: FAMILY MEDICINE CLINIC | Facility: CLINIC | Age: 24
End: 2024-10-22

## 2025-01-22 ENCOUNTER — OFFICE VISIT (OUTPATIENT)
Dept: FAMILY MEDICINE CLINIC | Facility: CLINIC | Age: 25
End: 2025-01-22
Payer: COMMERCIAL

## 2025-01-22 VITALS
DIASTOLIC BLOOD PRESSURE: 60 MMHG | BODY MASS INDEX: 19.63 KG/M2 | HEART RATE: 68 BPM | SYSTOLIC BLOOD PRESSURE: 102 MMHG | OXYGEN SATURATION: 98 % | TEMPERATURE: 98.7 F | HEIGHT: 64 IN | WEIGHT: 115 LBS

## 2025-01-22 DIAGNOSIS — Z23 ENCOUNTER FOR IMMUNIZATION: ICD-10-CM

## 2025-01-22 DIAGNOSIS — F41.9 ANXIETY: Primary | ICD-10-CM

## 2025-01-22 PROCEDURE — 99214 OFFICE O/P EST MOD 30 MIN: CPT | Performed by: NURSE PRACTITIONER

## 2025-01-22 PROCEDURE — 90471 IMMUNIZATION ADMIN: CPT

## 2025-01-22 PROCEDURE — 90651 9VHPV VACCINE 2/3 DOSE IM: CPT

## 2025-01-22 NOTE — ASSESSMENT & PLAN NOTE
Patient states that in December, she was traveling and was inconsistent with taking her medication. Then she took a break from it. Counseled patient that she should not be doing this and possible side effects that it can have. She states that she is having more anxious  thoughts, over thinking, worried about the future  and worried something bad will happen. Did feel like over time the effexor was not as effective but did feel like she was not consistent with it so this could be why. Will set alarm to take it the same time every day. If it does not help in the next 4-6 weeks, consider switching to another medication.

## 2025-01-22 NOTE — PROGRESS NOTES
"Name: Nora Alexander      : 2000      MRN: [id number]  Encounter Provider: GABE Lazaro  Encounter Date: 2025   Encounter department: [facility]  :  Assessment & Plan  Anxiety  Patient states that in December, she was traveling and was inconsistent with taking her medication. Then she took a break from it. Counseled patient that she should not be doing this and possible side effects that it can have. She states that she is having more anxious  thoughts, over thinking, worried about the future  and worried something bad will happen. Did feel like over time the effexor was not as effective but did feel like she was not consistent with it so this could be why. Will set alarm to take it the same time every day. If it does not help in the next 4-6 weeks, consider switching to another medication.          Encounter for immunization    Orders:  •  HPV VACCINE 9 VALENT IM  •  influenza vaccine preservative-free 0.5 mL IM (Fluzone, Afluria, Fluarix, Flulaval)           History of Present Illness   HPI  Review of Systems   Constitutional:  Negative for chills and fever.   Respiratory:  Negative for cough and shortness of breath.    Cardiovascular:  Negative for chest pain and palpitations.   Gastrointestinal:  Negative for abdominal pain and vomiting.   Skin:  Negative for color change and rash.   Psychiatric/Behavioral:  Negative for dysphoric mood, self-injury and suicidal ideas. The patient is nervous/anxious.    All other systems reviewed and are negative.      Objective   /60   Pulse 68   Temp 98.7 °F (37.1 °C)   Ht 5' 4\" (1.626 m)   Wt 52.2 kg (115 lb)   LMP 2025 (Exact Date)   SpO2 98%   BMI 19.74 kg/m²      Physical Exam  Vitals and nursing note reviewed.   Constitutional:       General: She is not in acute distress.     Appearance: She is well-developed.   HENT:      Head: Normocephalic and atraumatic.   Cardiovascular:      Rate and Rhythm: " Normal rate and regular rhythm.      Pulses: Normal pulses.      Heart sounds: Normal heart sounds. No murmur heard.  Pulmonary:      Effort: Pulmonary effort is normal. No respiratory distress.      Breath sounds: Normal breath sounds.   Skin:     General: Skin is warm and dry.      Capillary Refill: Capillary refill takes less than 2 seconds.   Neurological:      Mental Status: She is alert.   Psychiatric:         Mood and Affect: Mood is anxious.

## 2025-03-05 ENCOUNTER — OFFICE VISIT (OUTPATIENT)
Dept: FAMILY MEDICINE CLINIC | Facility: CLINIC | Age: 25
End: 2025-03-05
Payer: COMMERCIAL

## 2025-03-05 VITALS
HEIGHT: 64 IN | TEMPERATURE: 98.5 F | SYSTOLIC BLOOD PRESSURE: 106 MMHG | OXYGEN SATURATION: 99 % | HEART RATE: 78 BPM | WEIGHT: 119 LBS | DIASTOLIC BLOOD PRESSURE: 60 MMHG | BODY MASS INDEX: 20.32 KG/M2

## 2025-03-05 DIAGNOSIS — Z12.4 CERVICAL CANCER SCREENING: ICD-10-CM

## 2025-03-05 DIAGNOSIS — F41.9 ANXIETY: Primary | ICD-10-CM

## 2025-03-05 DIAGNOSIS — Z02.4 DRIVER'S PERMIT PHYSICAL EXAMINATION: ICD-10-CM

## 2025-03-05 PROCEDURE — 99214 OFFICE O/P EST MOD 30 MIN: CPT | Performed by: NURSE PRACTITIONER

## 2025-03-05 NOTE — PROGRESS NOTES
"Name: Nora Alexander      : 2000      MRN: [id number]  Encounter Provider: GABE Lazaro  Encounter Date: 3/5/2025   [facility]  :  Assessment & Plan  Anxiety  Stopped taking her Effexor. Felt like she was not ready to commit to taking medication at this time so she did not. She found a psychologist that she has been seeing on a weekly basis and feels like this has been helping. S.s of when to return reviewed.        's permit physical examination  No hx of cardiac conditions, seizures, diabetes, neurological issues. Has glasses, gets routine exams- has had one in the last few months. No hearing problems. Admits to alcohol and marijuana use a few times per month. Counseled on safety and not to drive after using either substance. No hx of drug or alcohol abuse.        Cervical cancer screening  Has been given referral for obgyn in the past for cervical cancer screening               History of Present Illness   HPI  Review of Systems   Constitutional:  Negative for chills and fever.   Eyes:  Negative for pain and visual disturbance.   Respiratory:  Negative for cough and shortness of breath.    Cardiovascular:  Negative for chest pain and palpitations.   Gastrointestinal:  Negative for abdominal pain, constipation, diarrhea, nausea and vomiting.   Genitourinary:  Negative for dysuria and hematuria.   Musculoskeletal:  Negative for arthralgias and back pain.   Skin:  Negative for color change and rash.   Neurological:  Negative for seizures and syncope.   Psychiatric/Behavioral:  Negative for self-injury, sleep disturbance and suicidal ideas. The patient is not nervous/anxious.    All other systems reviewed and are negative.      Objective   /60   Pulse 78   Temp 98.5 °F (36.9 °C)   Ht 5' 4\" (1.626 m)   Wt 54 kg (119 lb)   LMP 2025 (Exact Date)   SpO2 99%   BMI 20.43 kg/m²      Physical Exam  Constitutional:       " General: She is not in acute distress.     Appearance: Normal appearance. She is not ill-appearing or toxic-appearing.   HENT:      Head: Normocephalic and atraumatic.      Right Ear: Tympanic membrane, ear canal and external ear normal. There is no impacted cerumen.      Left Ear: Tympanic membrane, ear canal and external ear normal. There is no impacted cerumen.      Nose: Nose normal. No congestion or rhinorrhea.      Mouth/Throat:      Mouth: Mucous membranes are moist.      Pharynx: Oropharynx is clear. No oropharyngeal exudate or posterior oropharyngeal erythema.   Eyes:      General: No scleral icterus.        Right eye: No discharge.         Left eye: No discharge.      Conjunctiva/sclera: Conjunctivae normal.      Pupils: Pupils are equal, round, and reactive to light.   Cardiovascular:      Rate and Rhythm: Normal rate and regular rhythm.      Pulses: Normal pulses.      Heart sounds: Normal heart sounds. No murmur heard.     No friction rub. No gallop.   Pulmonary:      Effort: Pulmonary effort is normal. No respiratory distress.      Breath sounds: Normal breath sounds. No stridor. No wheezing, rhonchi or rales.   Abdominal:      General: Abdomen is flat. Bowel sounds are normal. There is no distension.      Palpations: Abdomen is soft. There is no mass.      Tenderness: There is no abdominal tenderness.   Musculoskeletal:         General: Normal range of motion.      Cervical back: Normal range of motion and neck supple. No rigidity. No muscular tenderness.   Lymphadenopathy:      Cervical: No cervical adenopathy.   Skin:     General: Skin is warm and dry.      Capillary Refill: Capillary refill takes less than 2 seconds.   Neurological:      General: No focal deficit present.      Mental Status: She is alert and oriented to person, place, and time. Mental status is at baseline.      Sensory: No sensory deficit.      Motor: No weakness.      Gait: Gait normal.   Psychiatric:         Mood and Affect:  Mood normal.         Behavior: Behavior normal.         Thought Content: Thought content normal.         Judgment: Judgment normal.

## 2025-03-05 NOTE — ASSESSMENT & PLAN NOTE
Stopped taking her Effexor. Felt like she was not ready to commit to taking medication at this time so she did not. She found a psychologist that she has been seeing on a weekly basis and feels like this has been helping. S.s of when to return reviewed.

## 2025-03-28 ENCOUNTER — OFFICE VISIT (OUTPATIENT)
Dept: URGENT CARE | Facility: CLINIC | Age: 25
End: 2025-03-28
Payer: COMMERCIAL

## 2025-03-28 VITALS
TEMPERATURE: 98.3 F | DIASTOLIC BLOOD PRESSURE: 62 MMHG | OXYGEN SATURATION: 99 % | RESPIRATION RATE: 18 BRPM | HEART RATE: 68 BPM | SYSTOLIC BLOOD PRESSURE: 110 MMHG

## 2025-03-28 DIAGNOSIS — G43.119 INTRACTABLE MIGRAINE WITH AURA WITHOUT STATUS MIGRAINOSUS: Primary | ICD-10-CM

## 2025-03-28 PROCEDURE — 99203 OFFICE O/P NEW LOW 30 MIN: CPT

## 2025-03-28 NOTE — PROGRESS NOTES
"  [facility]        NAME: Nora Alexander is a 24 y.o. female  : 2000    MRN: [id number]  DATE: 2025  TIME: 7:13 PM    Assessment and Plan   Intractable migraine with aura without status migrainosus [G43.119]  1. Intractable migraine with aura without status migrainosus          Discussed with patient that visual symptoms preceded by migraine headache characteristic of migraine with aura.  Physical exam unremarkable for any cranial nerve deficit, EOMs fully intact, cardiovascular exam without remarkable findings.  Discussed with patient red flag symptoms including sudden complete vision loss, diplopia, palpitations, tinnitus, presyncope/syncope, difficulty speaking, muscle weakness, uncoordinated movements and direct patient to seek immediate medical attention with any of those symptoms.  Encourage patient to follow-up with PCP and make them aware of new migraine with aura and to discuss future management/treatment.    Patient Instructions   Follow up with PCP to discuss new migraine  Monitor symptom recurrence/pattern of aura.    Follow up with PCP in 3-5 days.  Proceed to  ER if symptoms worsen.    If tests have been performed at [facility], our office will contact you with results if changes need to be made to the care plan discussed with you at the visit.  You can review your full results on [facility] MyChart.    Chief Complaint     Chief Complaint   Patient presents with    Blurred Vision     C/O right eye vision loss blurred vision earlier today          History of Present Illness       24-year-old female with past medical history of PCOS presenting with 1 episode of blurry vision followed by migraine headache.  Patient states that around 5:30 PM tonight she had a weird \"crystal like structure\" appearing in the middle of her right eyes vision.  After that she describes some black spots/floaters visualized from her right eye which lasted 10 to 15 minutes.  Following the resolution of " these visual symptoms she immediately had a migraine headache.  Patient notes that she has had migraines in the past, has never had an aura associated.  Patient has taken 4 Advil with major relief of her migraine symptoms.  Patient is denying any cardiac history, seizure activity, weakness, numbness, paresthesias, palpitations.  Patient does admit to looking at computer screens due to work for 10 to 12 hours daily for the past week.        Review of Systems   Review of Systems   Constitutional:  Negative for chills and fever.   HENT:  Positive for tinnitus. Negative for ear pain, hearing loss and sore throat.    Eyes:  Positive for visual disturbance. Negative for photophobia, pain and redness.   Respiratory:  Negative for cough and shortness of breath.    Cardiovascular:  Negative for chest pain and palpitations.   Gastrointestinal:  Negative for abdominal pain and vomiting.   Genitourinary:  Negative for dysuria and hematuria.   Musculoskeletal:  Negative for arthralgias and back pain.   Skin:  Negative for color change and rash.   Neurological:  Positive for headaches. Negative for dizziness, seizures, syncope, speech difficulty, weakness, light-headedness and numbness.   All other systems reviewed and are negative.        Current Medications       Current Outpatient Medications:     Clindamycin Phos-Benzoyl Perox gel, APPLY A THIN LAYTER TWICE A DAY TO ACNE AFFECTED AREAS ON FACE, Disp: , Rfl:     Sulfacetamide Sodium-Sulfur 10-5 % LIQD, APPLY A THIN LAYER TWICE A DAY TO ACNE AFFECTED AREAS ON FACE. LEAVE ON 5 MINUTES BEFORE WASHING, Disp: , Rfl:     tretinoin (RETIN-A) 0.05 % cream, Apply a pea size amount at bedtime to acne affected areas on face, Disp: , Rfl:     Current Allergies     Allergies as of 03/28/2025 - Reviewed 03/28/2025   Allergen Reaction Noted    Zoloft [sertraline] Angioedema 06/27/2024            The following portions of the patient's history were reviewed and updated as appropriate:  allergies, current medications, past family history, past medical history, past social history, past surgical history and problem list.     Past Medical History:   Diagnosis Date    PCOS (polycystic ovarian syndrome)        Past Surgical History:   Procedure Laterality Date    DENTAL SURGERY         Family History   Problem Relation Age of Onset    Heart attack Father     Hypertension Father     Heart disease Father     Diabetes type II Father     Polycystic ovary syndrome Sister     Breast cancer Maternal Grandmother     Pancreatic cancer Paternal Grandmother          Medications have been verified.        Objective   /62   Pulse 68   Temp 98.3 °F (36.8 °C)   Resp 18   LMP 02/28/2025 (Exact Date)   SpO2 99%   Patient's last menstrual period was 02/28/2025 (exact date).       Physical Exam     Physical Exam  Constitutional:       General: She is not in acute distress.     Appearance: Normal appearance. She is not ill-appearing.   HENT:      Head: Normocephalic and atraumatic.      Nose: Nose normal.      Mouth/Throat:      Mouth: Mucous membranes are moist.      Pharynx: No oropharyngeal exudate or posterior oropharyngeal erythema.   Eyes:      General: Lids are normal. Vision grossly intact. Gaze aligned appropriately. No visual field deficit.     Extraocular Movements: Extraocular movements intact.      Right eye: Normal extraocular motion and no nystagmus.      Left eye: Normal extraocular motion and no nystagmus.      Conjunctiva/sclera: Conjunctivae normal.      Pupils: Pupils are equal, round, and reactive to light.   Neck:      Vascular: No carotid bruit.   Cardiovascular:      Rate and Rhythm: Normal rate and regular rhythm.      Pulses: Normal pulses.      Heart sounds: Normal heart sounds. No murmur heard.     No friction rub. No gallop.   Pulmonary:      Effort: Pulmonary effort is normal.      Breath sounds: Normal breath sounds.   Abdominal:      General: Abdomen is flat.      Palpations:  Abdomen is soft.   Musculoskeletal:         General: Normal range of motion.      Cervical back: Normal range of motion and neck supple.   Skin:     General: Skin is warm and dry.      Capillary Refill: Capillary refill takes less than 2 seconds.   Neurological:      General: No focal deficit present.      Mental Status: She is alert and oriented to person, place, and time. Mental status is at baseline.      Cranial Nerves: No cranial nerve deficit.      Sensory: No sensory deficit.      Motor: No weakness.      Coordination: Coordination normal.      Gait: Gait normal.

## 2025-08-14 ENCOUNTER — TELEPHONE (OUTPATIENT)
Age: 25
End: 2025-08-14

## 2025-08-15 PROCEDURE — G0143 SCR C/V CYTO,THINLAYER,RESCR: HCPCS | Performed by: ADVANCED PRACTICE MIDWIFE
